# Patient Record
(demographics unavailable — no encounter records)

---

## 2024-12-26 NOTE — DISCUSSION/SUMMARY
[FreeTextEntry1] : This is a 75 year-old female, retired nurse from [employer], with a past medical history significant for asthma, history of breast cancer, status post Adriamycin therapy, status post hysterectomy, syncope and near syncopal events, murmur, comes in for cardiac follow up evaluation.   She denies chest pain, shortness of breath, dizziness or syncope. Lipid panel done September 19, 2024 by Dr. Ashraf demonstrated a cholesterol 174, HDL 56, LDL 86, non-HDL cholesterol 118, and triglycerides 161 mg/dL. She continues on Crestor 10 mg daily. She will have a color echo Doppler examination done December 26, 2024 to evaluate her left ventricular function, chamber size, murmur, rule out hypertrophy rule out mitral valve prolapse. The patient had normal exercise stress test done March 5, 2024. Lipid panel done December 11, 2023 demonstrated cholesterol 138, HDL 44, triglycerides 184, LDL calculated 64, non-HDL close 94, LDL direct 59 mg/dL. The patient will continue her current dose of Crestor 10 mg daily. She exercises on a daily basis and will continue to do so. Electrocardiogram done December 11, 2023 demonstrated normal sinus rhythm rate 94 bpm and is otherwise unremarkable. Electrocardiogram done January 9, 2023 demonstrated normal sinus rhythm rate of 80 bpm is otherwise unremarkable. She also was able to walk 5 to 8 miles a day while vacationing in [place] without symptoms. Echo Doppler examination done January 27, 2021 demonstrated mild mitral valve regurgitation, mild tricuspid valve regurgitation, mild pulmonic valve regurgitation with aortic valve sclerosis and mild aortic valve regurgitation estimated ejection fraction of 66 to 67%.  She is active, exercising, and watching her diet. Electrocardiogram done 2/7/2022 demonstrate normal sinus rhythm rate of 82 bpm is otherwise remarkable for left atrial abnormality. The patient remains on Crestor 10 mg daily.  She will have new blood work done today for lipid profile. Her LDL target remains 100 mg/dL. The patient had a normal exercise stress test January 27, 2021. PMH: The patient had been on Crestor 20 mg daily.  The dose was lowered to 10 mg/day due to a lower LDL and cholesterol result.  She will get new blood work today for lipid panel and SMA-20.  She continues to  The patient has heterozygous familial hypercholesterolemia and requires a cholesterol target of less than 100 mg/dL..   PMH: The patient has had syncopal episodes in the past usually around being on an airplane. She had an episode where she was sitting in her seat belt the onset of diaphoresis, nausea and had a syncopal episode. Another time she is able to fight off the feeling. She also had palpitations and irregular heartbeat after being prescribed Breo.  The patient understands that aerobic exercises must be increased to 40 minutes 4 times per week. A detailed discussion of lifestyle modification was done today. The patient has a good understanding of the diagnosis, and treatment plan. Lifestyle modification was also outlined.

## 2024-12-26 NOTE — DISCUSSION/SUMMARY
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   She denies chest pain, shortness of breath, dizziness or syncope. Lipid panel done September 19, 2024 by Dr. Ashraf demonstrated a cholesterol 174, HDL 56, LDL 86, non-HDL cholesterol 118, and triglycerides 161 mg/dL. She continues on Crestor 10 mg daily. She will have a color echo Doppler examination done December 26, 2024 to evaluate her left ventricular function, chamber size, murmur, rule out hypertrophy rule out mitral valve prolapse. The patient had normal exercise stress test done March 5, 2024. Lipid panel done December 11, 2023 demonstrated cholesterol 138, HDL 44, triglycerides 184, LDL calculated 64, non-HDL close 94, LDL direct 59 mg/dL. The patient will continue her current dose of Crestor 10 mg daily. She exercises on a daily basis and will continue to do so. Electrocardiogram done December 11, 2023 demonstrated normal sinus rhythm rate 94 bpm and is otherwise unremarkable. Electrocardiogram done January 9, 2023 demonstrated normal sinus rhythm rate of 80 bpm is otherwise unremarkable. She also was able to walk 5 to 8 miles a day while vacationing in [place] without symptoms. Echo Doppler examination done January 27, 2021 demonstrated mild mitral valve regurgitation, mild tricuspid valve regurgitation, mild pulmonic valve regurgitation with aortic valve sclerosis and mild aortic valve regurgitation estimated ejection fraction of 66 to 67%.  She is active, exercising, and watching her diet. Electrocardiogram done 2/7/2022 demonstrate normal sinus rhythm rate of 82 bpm is otherwise remarkable for left atrial abnormality. The patient remains on Crestor 10 mg daily.  She will have new blood work done today for lipid profile. Her LDL target remains 100 mg/dL. The patient had a normal exercise stress test January 27, 2021. PMH: The patient had been on Crestor 20 mg daily.  The dose was lowered to 10 mg/day due to a lower LDL and cholesterol result.  She will get new blood work today for lipid panel and SMA-20.  She continues to  The patient has heterozygous familial hypercholesterolemia and requires a cholesterol target of less than 100 mg/dL..   PMH: The patient has had syncopal episodes in the past usually around being on an airplane. She had an episode where she was sitting in her seat belt the onset of diaphoresis, nausea and had a syncopal episode. Another time she is able to fight off the feeling. She also had palpitations and irregular heartbeat after being prescribed Breo.  The patient understands that aerobic exercises must be increased to 40 minutes 4 times per week. A detailed discussion of lifestyle modification was done today. The patient has a good understanding of the diagnosis, and treatment plan. Lifestyle modification was also outlined.

## 2024-12-26 NOTE — REASON FOR VISIT
[CV Risk Factors and Non-Cardiac Disease] : CV risk factors and non-cardiac disease [Follow-Up - Clinic] : a clinic follow-up of [Dyspnea] : dyspnea [Hyperlipidemia] : hyperlipidemia [Palpitations] : palpitations [FreeTextEntry2] : stress test [FreeTextEntry1] : murmur, h/o syncopal events,

## 2024-12-26 NOTE — PHYSICAL EXAM
[General Appearance - Well Developed] : well developed [Normal Appearance] : normal appearance [Well Groomed] : well groomed [General Appearance - Well Nourished] : well nourished [No Deformities] : no deformities [General Appearance - In No Acute Distress] : no acute distress [Normal Conjunctiva] : the conjunctiva exhibited no abnormalities [Normal Oral Mucosa] : normal oral mucosa [Normal Jugular Venous A Waves Present] : normal jugular venous A waves present [Normal Jugular Venous V Waves Present] : normal jugular venous V waves present [No Jugular Venous Dillon A Waves] : no jugular venous dillon A waves [Respiration, Rhythm And Depth] : normal respiratory rhythm and effort [Exaggerated Use Of Accessory Muscles For Inspiration] : no accessory muscle use [Auscultation Breath Sounds / Voice Sounds] : lungs were clear to auscultation bilaterally [Bowel Sounds] : normal bowel sounds [Abdomen Soft] : soft [Abnormal Walk] : normal gait [Gait - Sufficient For Exercise Testing] : the gait was sufficient for exercise testing [Nail Clubbing] : no clubbing of the fingernails [Cyanosis, Localized] : no localized cyanosis [Skin Color & Pigmentation] : normal skin color and pigmentation [Skin Turgor] : normal skin turgor [] : no rash [Oriented To Time, Place, And Person] : oriented to person, place, and time [Affect] : the affect was normal [Mood] : the mood was normal [No Anxiety] : not feeling anxious [5th Left ICS - MCL] : palpated at the 5th LICS in the midclavicular line [Normal] : normal [No Precordial Heave] : no precordial heave was noted [Normal Rate] : normal [Normal S1] : normal S1 [Normal S2] : normal S2 [No Gallop] : no gallop heard [I] : a grade 1 [2+] : left 2+ [No Abnormalities] : the abdominal aorta was not enlarged and no bruit was heard [No Pitting Edema] : no pitting edema present [S3] : no S3 [S4] : no S4 [Right Carotid Bruit] : no bruit heard over the right carotid [Left Carotid Bruit] : no bruit heard over the left carotid [Right Femoral Bruit] : no bruit heard over the right femoral artery [Left Femoral Bruit] : no bruit heard over the left femoral artery

## 2024-12-26 NOTE — PHYSICAL EXAM
[General Appearance - Well Developed] : well developed [Normal Appearance] : normal appearance [Well Groomed] : well groomed [General Appearance - Well Nourished] : well nourished [No Deformities] : no deformities [General Appearance - In No Acute Distress] : no acute distress [Normal Conjunctiva] : the conjunctiva exhibited no abnormalities [Normal Oral Mucosa] : normal oral mucosa [Normal Jugular Venous A Waves Present] : normal jugular venous A waves present [Normal Jugular Venous V Waves Present] : normal jugular venous V waves present [No Jugular Venous Dillon A Waves] : no jugular venous dillon A waves [Respiration, Rhythm And Depth] : normal respiratory rhythm and effort [Auscultation Breath Sounds / Voice Sounds] : lungs were clear to auscultation bilaterally [Exaggerated Use Of Accessory Muscles For Inspiration] : no accessory muscle use [Bowel Sounds] : normal bowel sounds [Abdomen Soft] : soft [Abnormal Walk] : normal gait [Gait - Sufficient For Exercise Testing] : the gait was sufficient for exercise testing [Nail Clubbing] : no clubbing of the fingernails [Skin Color & Pigmentation] : normal skin color and pigmentation [Cyanosis, Localized] : no localized cyanosis [Skin Turgor] : normal skin turgor [] : no rash [Oriented To Time, Place, And Person] : oriented to person, place, and time [Affect] : the affect was normal [Mood] : the mood was normal [No Anxiety] : not feeling anxious [5th Left ICS - MCL] : palpated at the 5th LICS in the midclavicular line [Normal] : normal [No Precordial Heave] : no precordial heave was noted [Normal Rate] : normal [Normal S1] : normal S1 [Normal S2] : normal S2 [No Gallop] : no gallop heard [I] : a grade 1 [2+] : left 2+ [No Abnormalities] : the abdominal aorta was not enlarged and no bruit was heard [No Pitting Edema] : no pitting edema present [S3] : no S3 [S4] : no S4 [Right Carotid Bruit] : no bruit heard over the right carotid [Left Carotid Bruit] : no bruit heard over the left carotid [Right Femoral Bruit] : no bruit heard over the right femoral artery [Left Femoral Bruit] : no bruit heard over the left femoral artery